# Patient Record
Sex: MALE
[De-identification: names, ages, dates, MRNs, and addresses within clinical notes are randomized per-mention and may not be internally consistent; named-entity substitution may affect disease eponyms.]

---

## 2022-03-27 ENCOUNTER — TRANSCRIPTION ENCOUNTER (OUTPATIENT)
Age: 42
End: 2022-03-27

## 2022-03-28 ENCOUNTER — APPOINTMENT (OUTPATIENT)
Dept: ULTRASOUND IMAGING | Facility: CLINIC | Age: 42
End: 2022-03-28
Payer: COMMERCIAL

## 2022-03-28 ENCOUNTER — OUTPATIENT (OUTPATIENT)
Dept: OUTPATIENT SERVICES | Facility: HOSPITAL | Age: 42
LOS: 1 days | End: 2022-03-28

## 2022-03-28 PROCEDURE — 76700 US EXAM ABDOM COMPLETE: CPT | Mod: 26

## 2022-03-30 ENCOUNTER — APPOINTMENT (OUTPATIENT)
Dept: ULTRASOUND IMAGING | Facility: HOSPITAL | Age: 42
End: 2022-03-30

## 2022-08-23 ENCOUNTER — APPOINTMENT (OUTPATIENT)
Dept: ORTHOPEDIC SURGERY | Facility: AMBULATORY SURGERY CENTER | Age: 42
End: 2022-08-23

## 2022-08-23 VITALS — HEIGHT: 72 IN | BODY MASS INDEX: 25.06 KG/M2 | WEIGHT: 185 LBS

## 2022-08-23 DIAGNOSIS — Z78.9 OTHER SPECIFIED HEALTH STATUS: ICD-10-CM

## 2022-08-23 DIAGNOSIS — Z86.39 PERSONAL HISTORY OF OTHER ENDOCRINE, NUTRITIONAL AND METABOLIC DISEASE: ICD-10-CM

## 2022-08-23 DIAGNOSIS — Z87.39 PERSONAL HISTORY OF OTHER DISEASES OF THE MUSCULOSKELETAL SYSTEM AND CONNECTIVE TISSUE: ICD-10-CM

## 2022-08-23 PROCEDURE — 99203 OFFICE O/P NEW LOW 30 MIN: CPT

## 2022-08-23 RX ORDER — LEVOTHYROXINE SODIUM 0.17 MG/1
TABLET ORAL
Refills: 0 | Status: ACTIVE | COMMUNITY

## 2022-08-23 RX ORDER — ALLOPURINOL 200 MG/1
TABLET ORAL
Refills: 0 | Status: ACTIVE | COMMUNITY

## 2022-08-23 NOTE — ASSESSMENT
[FreeTextEntry1] : 43 y/o male with RIGHT leg injury with exam consistent mostly with a medial gastrocnemius strain but there may be a partial injury to the Achilles based on the decreased tension in the Achilles and decreased Alvarez test/foot plantarflexion.  Therefore I placed him in a walking boot and will get an ultrasound just to look at the Achilles and gastrocnemius.\par I referred him for an ultrasound to make sure there is not any partial Achilles tear and to confirm the diagnosis.  If for some reason he is not able to get the ultrasound done an MRI would be an alternative.\par In the meantime he will wear the walking boot when walking but does not need to have it on when sleeping.  Elevate and ice as needed.  I should see him back in 2 weeks to evaluate further but we will call him as soon as I have any test results.\par He should get heel lifts that he puts in his contralateral shoe right now when wearing the boot and then he will use heel lifts in his sneakers when he comes out of the boot.\par He will need physical therapy as it is getting better.

## 2022-08-23 NOTE — PHYSICAL EXAM
[LE] : Sensory: Intact in bilateral lower extremities [Normal RLE] : Right Lower Extremity: No scars, rashes, lesions, ulcers, skin intact [Normal LLE] : Left Lower Extremity: No scars, rashes, lesions, ulcers, skin intact [Normal Touch] : sensation intact for touch [Normal] : Oriented to person, place, and time, insight and judgement were intact and the affect was normal [de-identified] : RIGHT lower leg and ankle\par Antalgic gait.\par He cannot walk on his toes.\par Some atrophy of the right calf compared to the left.  Resolving ecchymoses right lower leg.\par Tender greatest in the medial gastrocnemius near the musculotendinous junction.\par No palpable gap at the Achilles but there does appear to be slightly less tension in the right Achilles gastrocsoleus complex than the left with less foot plantarflexion at rest.  There is also decreased but not absent foot plantarflexion on Alvarez test on the right compared to the left.\par Cl's deformity bilateral heels which are nontender.\par Anterior tibial tendon, EHL, FHL, peroneals and posterior tibial tendon are intact 5/5.  Intact active foot plantarflexion but with weakness.\par Varicose veins.\par Normal capillary refill in foot is warm.  Sensation is intact.\par  [de-identified] : \par No x-rays.

## 2022-08-23 NOTE — HISTORY OF PRESENT ILLNESS
[de-identified] : Mr. Bunn is a 42 year old male who comes in for evaluation for RIGHT Achilles/calf pain that started when he was playing soccer 8/11/22. It was a non contact injury, made a sudden movement. He states it felt like he was hit in the back of the leg playing golf.\par He had sharp pain.  There was bruising and pain walking.  It is getting better.  With the saw his primary care doctor who referred him for further evaluation.  He may have had a calf strain from playing tennis many years ago in high school.  Otherwise no other injuries to his leg.  No chronic pain or preceding pain.  He had warmed up well and there was nothing unusual on the days that he was playing

## 2022-08-24 ENCOUNTER — NON-APPOINTMENT (OUTPATIENT)
Age: 42
End: 2022-08-24

## 2022-09-07 ENCOUNTER — APPOINTMENT (OUTPATIENT)
Dept: ORTHOPEDIC SURGERY | Facility: CLINIC | Age: 42
End: 2022-09-07

## 2022-09-07 PROCEDURE — 99213 OFFICE O/P EST LOW 20 MIN: CPT

## 2022-09-07 NOTE — ASSESSMENT
[FreeTextEntry1] : 43 y/o male with RIGHT medial gastrocnemius strain that occurred almost 4 weeks ago that seems to be healing well.  Recommended that he wean out of the walking boot in the next week or 2 as tolerated but wear sneakers with a heel lift.  He can work some ankle range of motion.  He should call to schedule physical therapy to start in about 1-1/2 weeks.\par Follow-up in about 4 to 5 weeks to make sure he is getting better.  He should not return to soccer or sports until he is rehabilitated for the injury to try to prevent recurrent injuries

## 2022-09-07 NOTE — HISTORY OF PRESENT ILLNESS
[de-identified] : Mr. Bunn is a 42 year old male who comes in for follow up for RIGHT calf injury that started when he was playing soccer 8/11/22. It was a non contact injury, made a sudden movement. \par He has been wearing the boot.  Its feeling progressively better.\par He had an ultrasound on 8/26/22 that showed a gastrocnemius tear.  The Achilles was fine.

## 2022-09-07 NOTE — PHYSICAL EXAM
[LE] : Sensory: Intact in bilateral lower extremities [Normal RLE] : Right Lower Extremity: No scars, rashes, lesions, ulcers, skin intact [Normal LLE] : Left Lower Extremity: No scars, rashes, lesions, ulcers, skin intact [Normal Touch] : sensation intact for touch [Normal] : Oriented to person, place, and time, insight and judgement were intact and the affect was normal [de-identified] : RIGHT lower leg and ankle\par Mildly antalgic gait walking with short steps but less pain and better than last visit.\par He can walk on his toes.\par Mild atrophy of the right calf compared to the left.  No edema, erythema, ecchymoses right lower leg.\par Tender mildly in the medial gastrocnemius near the musculotendinous junction.\par No palpable gap at the Achilles normal intact Alvarez test\par Cl's deformity bilateral heels which are nontender.\par Anterior tibial tendon, EHL, FHL, peroneals and posterior tibial tendon are intact 5/5.  Intact active foot plantarflexion but with weakness.\par Varicose veins.\par Normal capillary refill in foot is warm.  Sensation is intact.\par  [de-identified] : \par \par Ultrasound of RIGHT ankle on 8/26/22 showed an intramuscular tear of the gastrocnemius muscle with an adjacent hematoma in the facial plane between the gastrocnemius and soleus muscles. \par \par

## 2022-10-07 ENCOUNTER — APPOINTMENT (OUTPATIENT)
Dept: ORTHOPEDIC SURGERY | Facility: CLINIC | Age: 42
End: 2022-10-07

## 2022-10-13 ENCOUNTER — APPOINTMENT (OUTPATIENT)
Dept: ORTHOPEDIC SURGERY | Facility: CLINIC | Age: 42
End: 2022-10-13

## 2022-10-13 DIAGNOSIS — M92.61 JUVENILE OSTEOCHONDROSIS OF TARSUS, RIGHT ANKLE: ICD-10-CM

## 2022-10-13 DIAGNOSIS — Z00.00 ENCOUNTER FOR GENERAL ADULT MEDICAL EXAMINATION W/OUT ABNORMAL FINDINGS: ICD-10-CM

## 2022-10-13 PROCEDURE — 99214 OFFICE O/P EST MOD 30 MIN: CPT

## 2022-10-13 NOTE — ASSESSMENT
[FreeTextEntry1] : 43 y/o male with RIGHT medial gastrocnemius strain that occurred 2 months ago that seems to be healing well.  He will finish up the physical therapy in the next 2 to 4 weeks.  He can gradually work his way back to sports.  He should make sure he warms up well and should stop if he is feeling pain.  There may be a little sensitivity of the sural nerve.  There was no penetrating trauma or reason to be concerned about any significant injury to the nerve.\par I would expect this to improve over time.\par For his Cl's he should try to avoid wearing shoes that are rubbing.  If anything causes irritation he should try to modify or come in as needed for that or any other issues.\par If he is not able to get back to sports or is having any issues with his gastroc or Achilles he should come in for follow-up.

## 2022-10-13 NOTE — PHYSICAL EXAM
[LE] : Sensory: Intact in bilateral lower extremities [Normal RLE] : Right Lower Extremity: No scars, rashes, lesions, ulcers, skin intact [Normal LLE] : Left Lower Extremity: No scars, rashes, lesions, ulcers, skin intact [Normal Touch] : sensation intact for touch [Normal] : Oriented to person, place, and time, insight and judgement were intact and the affect was normal [de-identified] : RIGHT lower leg and ankle\par Normal gait.  He can walk on his heels and toes without difficulty.\par Varicose veins.  No edema, ecchymoses, erythema\par Minimal atrophy of the right calf compared to the left.  No edema, erythema, ecchymoses right lower leg.\par Nontender in the medial gastrocnemius near the musculotendinous junction.\par No palpable gap at the Achilles normal intact Alvarez test\par Cl's deformity bilateral heels which are nontender.  Overlying callus\par Anterior tibial tendon, EHL, FHL, peroneals and posterior tibial tendon are intact 5/5.  Intact active foot plantarflexion but with weakness.\par Varicose veins.\par Normal capillary refill in foot is warm.  Sensation is intact.  There is a slight Tinel's overlying the Achilles about the level where the sural nerve would cross.  Sensation is intact in the sural nerve distribution\par

## 2022-10-13 NOTE — HISTORY OF PRESENT ILLNESS
[de-identified] : Mr. Bunn is a 42 year old male who comes in for follow up for RIGHT calf injury that started when he was playing soccer 8/11/22--  non contact injury, made a sudden movement.  It's feeling progressively better.\par He had an ultrasound on 8/26/22 that showed a gastrocnemius tear.  The Achilles was fine.\par He has been doing PT for about a month and he is making progress and feeling better. His only complaint is some tightness, very minimal pain. He feels it when stretching and sometimes going down stairs. He has not been wearing the boot and just a sneaker recently.

## 2023-04-05 ENCOUNTER — APPOINTMENT (OUTPATIENT)
Dept: ORTHOPEDIC SURGERY | Facility: CLINIC | Age: 43
End: 2023-04-05
Payer: COMMERCIAL

## 2023-04-05 ENCOUNTER — NON-APPOINTMENT (OUTPATIENT)
Age: 43
End: 2023-04-05

## 2023-04-05 DIAGNOSIS — M79.605 PAIN IN LEFT LEG: ICD-10-CM

## 2023-04-05 PROCEDURE — 99214 OFFICE O/P EST MOD 30 MIN: CPT

## 2023-04-05 NOTE — HISTORY OF PRESENT ILLNESS
[de-identified] : Mr. Bunn is a 43 year old male who comes in for evaluation for LEFT calf pain that started about a month ago. He was playing soccer the day before does not recall a specific injury but the next day was very tight, painful and swollen. That lasted a couple weeks. He toot some NSAIDs and that helped decrease the swelling. He no longer has pain walking on it. He is in PT for his RIGHT calf which he injured last fall so the physical therapist is working on both now. He has not tried running or playing soccer yet. \par His RIGHT calf is feeling much better. I last saw him for this in October 2022. He has done a lot of physical therapy and returned to playing soccer. No complaints.\par Pain in his left leg is not quite as bad as the right.  He also has been traveling.\par He states that he had continued physical therapy for the right side because it was tender when he would get massages.

## 2023-04-05 NOTE — PHYSICAL EXAM
[Normal] : Gait: normal [de-identified] : Bilateral lower legs\par He walks with a nonantalgic gait and can walk on his heels and toes.  On the left he has mild pain.\par In the left leg there is mild brawny like edema.\par Tender in the mid to lateral gastrocnemius on the left.  Nontender right.\par Normal Alvarez test.\par Achilles feels intact.\par Cl's right.\par Anterior tibial tendon, gastrocsoleus, peroneals and posterior tibial tendon are intact.\par Sensation is intact.\par Normal neurovascular exam [de-identified] : \par

## 2023-04-05 NOTE — ASSESSMENT
[FreeTextEntry1] : 43-year-old left leg pain that started a month ago after playing soccer.  He had recovered from the right medial gastrocnemius strain that occurred about 6 months ago.\par I think he likely had a strain on the left side.  Given the lack of a really acute event however I will refer him for Doppler ultrasound to rule out DVT.  There is still some swelling in his leg.  He should elevate and do warm soaks and ice and wear compression stockings.  He will go to physical therapy.  I will call him with the results of the Doppler ultrasound to confirm that there is no DVT hopefully.\par If there is he would need blood thinners and I would refer him to vascular or hematology for work-up.\par Follow-up in about a month.

## 2023-04-15 ENCOUNTER — APPOINTMENT (OUTPATIENT)
Dept: MRI IMAGING | Facility: CLINIC | Age: 43
End: 2023-04-15
Payer: COMMERCIAL

## 2023-04-15 ENCOUNTER — OUTPATIENT (OUTPATIENT)
Dept: OUTPATIENT SERVICES | Facility: HOSPITAL | Age: 43
LOS: 1 days | End: 2023-04-15

## 2023-04-15 ENCOUNTER — RESULT REVIEW (OUTPATIENT)
Age: 43
End: 2023-04-15

## 2023-04-15 PROCEDURE — 74183 MRI ABD W/O CNTR FLWD CNTR: CPT | Mod: 26

## 2023-05-30 ENCOUNTER — APPOINTMENT (OUTPATIENT)
Age: 43
End: 2023-05-30
Payer: COMMERCIAL

## 2023-05-30 VITALS — HEIGHT: 72 IN | BODY MASS INDEX: 24.38 KG/M2 | WEIGHT: 180 LBS

## 2023-05-30 PROCEDURE — 97802 MEDICAL NUTRITION INDIV IN: CPT

## 2023-09-07 ENCOUNTER — APPOINTMENT (OUTPATIENT)
Age: 43
End: 2023-09-07
Payer: COMMERCIAL

## 2023-09-07 PROCEDURE — 97803 MED NUTRITION INDIV SUBSEQ: CPT

## 2023-09-22 ENCOUNTER — APPOINTMENT (OUTPATIENT)
Dept: RADIOLOGY | Facility: CLINIC | Age: 43
End: 2023-09-22

## 2023-09-22 ENCOUNTER — OUTPATIENT (OUTPATIENT)
Dept: OUTPATIENT SERVICES | Facility: HOSPITAL | Age: 43
LOS: 1 days | End: 2023-09-22
Payer: COMMERCIAL

## 2023-09-22 PROCEDURE — 71046 X-RAY EXAM CHEST 2 VIEWS: CPT | Mod: 26

## 2023-09-28 ENCOUNTER — APPOINTMENT (OUTPATIENT)
Dept: ORTHOPEDIC SURGERY | Facility: CLINIC | Age: 43
End: 2023-09-28

## 2023-10-09 ENCOUNTER — OUTPATIENT (OUTPATIENT)
Dept: OUTPATIENT SERVICES | Facility: HOSPITAL | Age: 43
LOS: 1 days | End: 2023-10-09

## 2023-10-09 ENCOUNTER — APPOINTMENT (OUTPATIENT)
Dept: RADIOLOGY | Facility: CLINIC | Age: 43
End: 2023-10-09
Payer: COMMERCIAL

## 2023-10-09 PROCEDURE — 73590 X-RAY EXAM OF LOWER LEG: CPT | Mod: 26,LT

## 2023-10-14 ENCOUNTER — APPOINTMENT (OUTPATIENT)
Dept: MRI IMAGING | Facility: CLINIC | Age: 43
End: 2023-10-14
Payer: COMMERCIAL

## 2023-10-14 ENCOUNTER — OUTPATIENT (OUTPATIENT)
Dept: OUTPATIENT SERVICES | Facility: HOSPITAL | Age: 43
LOS: 1 days | End: 2023-10-14

## 2023-10-14 PROCEDURE — 73720 MRI LWR EXTREMITY W/O&W/DYE: CPT | Mod: 26,LT

## 2023-10-30 ENCOUNTER — NON-APPOINTMENT (OUTPATIENT)
Age: 43
End: 2023-10-30

## 2023-11-02 ENCOUNTER — APPOINTMENT (OUTPATIENT)
Dept: ORTHOPEDIC SURGERY | Facility: CLINIC | Age: 43
End: 2023-11-02
Payer: COMMERCIAL

## 2023-11-02 DIAGNOSIS — S86.112A STRAIN OF OTHER MUSCLE(S) AND TENDON(S) OF POSTERIOR MUSCLE GROUP AT LOWER LEG LEVEL, LEFT LEG, INITIAL ENCOUNTER: ICD-10-CM

## 2023-11-02 DIAGNOSIS — S86.111D STRAIN OF OTHER MUSCLE(S) AND TENDON(S) OF POSTERIOR MUSCLE GROUP AT LOWER LEG LEVEL, RIGHT LEG, SUBSEQUENT ENCOUNTER: ICD-10-CM

## 2023-11-02 DIAGNOSIS — R60.0 LOCALIZED EDEMA: ICD-10-CM

## 2023-11-02 DIAGNOSIS — M79.606 PAIN IN LEG, UNSPECIFIED: ICD-10-CM

## 2023-11-02 PROCEDURE — 99214 OFFICE O/P EST MOD 30 MIN: CPT

## 2023-11-22 ENCOUNTER — APPOINTMENT (OUTPATIENT)
Dept: VASCULAR SURGERY | Facility: CLINIC | Age: 43
End: 2023-11-22
Payer: COMMERCIAL

## 2023-11-22 VITALS
HEIGHT: 72 IN | WEIGHT: 185 LBS | HEART RATE: 87 BPM | DIASTOLIC BLOOD PRESSURE: 97 MMHG | SYSTOLIC BLOOD PRESSURE: 137 MMHG | BODY MASS INDEX: 25.06 KG/M2

## 2023-11-22 DIAGNOSIS — I83.90 ASYMPTOMATIC VARICOSE VEINS OF UNSPECIFIED LOWER EXTREMITY: ICD-10-CM

## 2023-11-22 PROCEDURE — 93971 EXTREMITY STUDY: CPT

## 2023-11-22 PROCEDURE — 99203 OFFICE O/P NEW LOW 30 MIN: CPT

## 2023-11-22 RX ORDER — COLCHICINE 0.6 MG/1
TABLET ORAL
Refills: 0 | Status: DISCONTINUED | COMMUNITY
End: 2023-11-22

## 2023-11-22 RX ORDER — FINASTERIDE 1 MG/1
TABLET, FILM COATED ORAL
Refills: 0 | Status: ACTIVE | COMMUNITY

## 2023-11-29 ENCOUNTER — APPOINTMENT (OUTPATIENT)
Dept: ORTHOPEDIC SURGERY | Facility: CLINIC | Age: 43
End: 2023-11-29
Payer: COMMERCIAL

## 2023-11-29 DIAGNOSIS — I87.8 OTHER SPECIFIED DISORDERS OF VEINS: ICD-10-CM

## 2023-11-29 DIAGNOSIS — L94.0 LOCALIZED SCLERODERMA [MORPHEA]: ICD-10-CM

## 2023-11-29 PROCEDURE — 99214 OFFICE O/P EST MOD 30 MIN: CPT

## 2023-12-12 ENCOUNTER — OFFICE (OUTPATIENT)
Dept: URBAN - METROPOLITAN AREA CLINIC 28 | Facility: CLINIC | Age: 43
Setting detail: OPHTHALMOLOGY
End: 2023-12-12
Payer: COMMERCIAL

## 2023-12-12 DIAGNOSIS — H00.12: ICD-10-CM

## 2023-12-12 PROCEDURE — 99203 OFFICE O/P NEW LOW 30 MIN: CPT | Performed by: OPHTHALMOLOGY

## 2023-12-12 ASSESSMENT — CONFRONTATIONAL VISUAL FIELD TEST (CVF)
OD_FINDINGS: FULL
OS_FINDINGS: FULL

## 2023-12-14 ENCOUNTER — APPOINTMENT (OUTPATIENT)
Age: 43
End: 2023-12-14
Payer: COMMERCIAL

## 2023-12-14 DIAGNOSIS — K70.0 ALCOHOLIC FATTY LIVER: ICD-10-CM

## 2023-12-14 PROCEDURE — 97803 MED NUTRITION INDIV SUBSEQ: CPT

## 2024-03-14 ENCOUNTER — APPOINTMENT (OUTPATIENT)
Age: 44
End: 2024-03-14

## 2024-05-15 ENCOUNTER — OUTPATIENT (OUTPATIENT)
Dept: OUTPATIENT SERVICES | Facility: HOSPITAL | Age: 44
LOS: 1 days | End: 2024-05-15

## 2024-05-15 ENCOUNTER — RESULT REVIEW (OUTPATIENT)
Age: 44
End: 2024-05-15

## 2024-05-15 ENCOUNTER — APPOINTMENT (OUTPATIENT)
Dept: ULTRASOUND IMAGING | Facility: CLINIC | Age: 44
End: 2024-05-15
Payer: COMMERCIAL

## 2024-05-15 PROCEDURE — 76700 US EXAM ABDOM COMPLETE: CPT | Mod: 26

## 2024-06-06 ENCOUNTER — APPOINTMENT (OUTPATIENT)
Dept: ORTHOPEDIC SURGERY | Facility: CLINIC | Age: 44
End: 2024-06-06

## 2025-03-19 ENCOUNTER — OUTPATIENT (OUTPATIENT)
Dept: OUTPATIENT SERVICES | Facility: HOSPITAL | Age: 45
LOS: 1 days | End: 2025-03-19

## 2025-03-19 ENCOUNTER — APPOINTMENT (OUTPATIENT)
Dept: CT IMAGING | Facility: CLINIC | Age: 45
End: 2025-03-19
Payer: COMMERCIAL

## 2025-03-19 PROCEDURE — 75574 CT ANGIO HRT W/3D IMAGE: CPT | Mod: 26

## 2025-03-21 ENCOUNTER — OUTPATIENT (OUTPATIENT)
Dept: OUTPATIENT SERVICES | Facility: HOSPITAL | Age: 45
LOS: 1 days | End: 2025-03-21
Payer: COMMERCIAL

## 2025-03-21 PROCEDURE — 75580 N-INVAS EST C FFR SW ALY CTA: CPT | Mod: 26

## 2025-07-17 ENCOUNTER — APPOINTMENT (OUTPATIENT)
Dept: MRI IMAGING | Facility: CLINIC | Age: 45
End: 2025-07-17

## 2025-07-17 ENCOUNTER — OUTPATIENT (OUTPATIENT)
Dept: OUTPATIENT SERVICES | Facility: HOSPITAL | Age: 45
LOS: 1 days | End: 2025-07-17